# Patient Record
Sex: MALE | Race: WHITE | Employment: FULL TIME | URBAN - METROPOLITAN AREA
[De-identification: names, ages, dates, MRNs, and addresses within clinical notes are randomized per-mention and may not be internally consistent; named-entity substitution may affect disease eponyms.]

---

## 2022-06-01 ENCOUNTER — HOSPITAL ENCOUNTER (OUTPATIENT)
Dept: NEUROLOGY | Age: 62
Discharge: HOME | End: 2022-06-01
Payer: COMMERCIAL

## 2022-06-01 DIAGNOSIS — G60.3 IDIOPATHIC PROGRESSIVE POLYNEUROPATHY: ICD-10-CM

## 2022-06-01 PROCEDURE — 95910 NRV CNDJ TEST 7-8 STUDIES: CPT

## 2022-06-01 PROCEDURE — 95886 MUSC TEST DONE W/N TEST COMP: CPT

## 2022-06-01 NOTE — PROCEDURES
Test Date:  2022    Patient: Saulo [place]  : 1960 Physician: Stacy Velasco DO   Sex: Male ID#:  Ref Phys: Cassie Toney DPM     Patient Complaints:  Patient is a 60year-old male who presents with in the numbness tingling in the feet onset few years ago. Patient History / Exam:  PMH no endocrine disease, + htn, cholesterol + acid reflux. no back or foot surgery  PE: reflexes 1+ normal strength     NCV & EMG Findings:  Evaluation of the left medial plantar (mixed) sensory nerve showed prolonged distal peak latency (4.3 ms). The right medial plantar (mixed) sensory nerve showed prolonged distal peak latency (4.3 ms) and reduced amplitude (5 µV). All remaining nerves (as indicated in the following tables) were within normal limits. All examined muscles (as indicated in the following table) showed no evidence of electrical instability. Impression:  Study is suggestive of mild bilateral tarsal tunnel syndrome. no evidence of an acute radiculopathy or other lower motor neuron dysfunction.          Stacy Velasco DO        Nerve Conduction Studies  Motor Nerve Results      Latency Amplitude F-Lat Segment Distance CV Comment   Site (ms) Norm (mV) Norm (ms)  (cm) (m/s) Norm    Left Fibular (EDB) Motor   Ankle 3.4  < 6.1 7.2  > 2.0         Bel Fib Head 12.5 - 5.1 -  Bel Fib Head-Ankle 36 40  > 38    Right Fibular (EDB) Motor   Ankle 4.3  < 6.1 3.0  > 2.0         Bel Fib Head 12.9 - 3.6 -  Bel Fib Head-Ankle 36 42  > 38    Left Tibial (AHB) Motor   Ankle 5.4  < 6.1 15.6  > 4.4         Right Tibial (AHB) Motor   Ankle 5.7  < 6.1 11.4  > 4.4           Sensory Nerve Results      Latency (Peak) Amplitude (P-P) Segment Distance CV Comment   Site (ms) Norm (µV) Norm  (cm) (m/s) Norm    Left Medial Plantar (Mixed) Sensory   Med Sole-Med Mall 4.3  < 3.7 12  > 10 Med Sole-Med Mall - - -    Right Medial Plantar (Mixed) Sensory   Med Sole-Med Mall 4.3  < 3.7 5  > 10 Med Sole-Med Mall - - -    Left Sural Sensory   Calf-Lat Mall 2.3  < 4.0 15  > 5 Calf-Lat Mall 14 61  > 35    Right Sural Sensory   Calf-Lat Mall 3.1  < 4.0 13  > 5 Calf-Lat Mall 14 45  > 35        Electromyography     Side Muscle Nerve Root Ins Act Fibs Psw Amp Dur Poly Recrt Int Jaden Mitten Comment   Right Gluteus Med Sup Gluteal L5-S1 Nml Nml Nml Nml Nml 0 Nml Nml    Right Vastus Med Femoral L2-L4 Nml Nml Nml Nml Nml 0 Nml Nml    Right Add Longus Obturator L2-L4 Nml Nml Nml Nml Nml 0 Nml Nml    Right Tib Anterior Deep Fibular,  Fibula. .. L4-L5 Nml Nml Nml Nml Nml 0 Nml Nml    Right Fib longus  L5-S1 Nml Nml Nml Nml Nml 0 Nml Nml    Right Gastroc MH Tibial S1-S2 Nml Nml Nml Nml Nml 0 Nml Nml    Right Ext Xavier Long Deep Fibular,  Fibula. .. L5-S1 Nml Nml Nml Nml Nml 0 Nml Nml    Right EDB Deep Fibular,  Fibula. .. L5-S1 Nml Nml Nml Nml Nml 0 Nml Nml    Right AHB Medial Plantar,  Tibi. .. S1-S2 Nml Nml Nml Nml Nml 0 Nml Nml    Right Lumbo Paraspinal (Upper) Rami L1-L2 Nml Nml Nml         Right Lumbo Paraspinal (Mid) Rami L3-L4 Nml Nml Nml         Right Lumbo Paraspinal (Lower) Rami L5-S1 Nml Nml Nml         Left Gluteus Med Sup Gluteal L5-S1 Nml Nml Nml Nml Nml 0 Nml Nml    Left Vastus Med Femoral L2-L4 Nml Nml Nml Nml Nml 0 Nml Nml    Left Add Longus Obturator L2-L4 Nml Nml Nml Nml Nml 0 Nml Nml    Left Tib Anterior Deep Fibular,  Fibula. .. L4-L5 Nml Nml Nml Nml Nml 0 Nml Nml    Left Fib longus  L5-S1 Nml Nml Nml Nml Nml 0 Nml Nml    Left Gastroc MH Tibial S1-S2 Nml Nml Nml Nml Nml 0 Nml Nml    Left Ext Xavier Long Deep Fibular,  Fibula. .. L5-S1 Nml Nml Nml Nml Nml 0 Nml Nml    Left EDB Deep Fibular,  Fibula. .. L5-S1 Nml Nml Nml Nml Nml 0 Nml Nml    Left AHB Medial Plantar,  Tibi. ..  S1-S2 Nml Nml Nml Nml Nml 0 Nml Nml    Left Lumbo Paraspinal (Upper) Rami L1-L2 Nml Nml Nml         Left Lumbo Paraspinal (Mid) Rami L3-L4 Nml Nml Nml         Left Lumbo Paraspinal (Lower) Rami L5-S1 Nml Nml Nml             Electronically signed by Laverne Bates DO on 6/1/2022 at 7:42 AM